# Patient Record
Sex: FEMALE | Race: WHITE | Employment: FULL TIME | URBAN - METROPOLITAN AREA
[De-identification: names, ages, dates, MRNs, and addresses within clinical notes are randomized per-mention and may not be internally consistent; named-entity substitution may affect disease eponyms.]

---

## 2017-05-03 ENCOUNTER — HOSPITAL ENCOUNTER (OUTPATIENT)
Dept: PHYSICAL THERAPY | Age: 32
Discharge: HOME | End: 2017-05-03
Payer: COMMERCIAL

## 2017-05-03 PROCEDURE — 97110 THERAPEUTIC EXERCISES: CPT

## 2017-05-03 PROCEDURE — 97161 PT EVAL LOW COMPLEX 20 MIN: CPT

## 2017-05-03 NOTE — PROGRESS NOTES
Garett Adeline  : 1985 [facility] [address]  [phone]   [phone]          OUTPATIENT PHYSICAL THERAPY:Initial Assessment and Daily Note 5/3/2017    ICD-10: Treatment Diagnosis: Plantar fascial fibromatosis (M72.2), Tarsal tunnel syndrome, left and right (G57.51, G57.52), Pain in right ankle/joints of foot (M25.571), Pain in left ankle/joints of foot (M25.572)  Precautions/Allergies:   No known allergies  Fall Risk Score: 0 (? 5 = High Risk)  MD Orders: Evaluate and Treat MEDICAL/REFERRING DIAGNOSIS:  Plantar fascial fibromatosis [M72.2]  Tarsal tunnel syndrome, left lower limb [G57.52]  Tarsal tunnel syndrome, right lower limb [G57.51]   DATE OF ONSET: 3 years ago  REFERRING PHYSICIAN: Ministerio Gordon MD  RETURN PHYSICIAN APPOINTMENT: TBD     INITIAL ASSESSMENT:  Ms. Neil May presents with bilateral plantar fasciitis which has been present for the past 3 years. Her chief complaint is pain but also has decreased flexibility, strength and functional activity tolerance which limits her ability to perform work and ADL tasks without complaints of severe foot pain. She will benefit from skilled therapy in order to improve her strength, flexibility and pain to return to highest level of function possible     PROBLEM LIST (Impacting functional limitations):  1. Decreased Strength  2. Decreased ADL/Functional Activities  3. Decreased Balance  4. Increased Pain  5. Decreased Activity Tolerance  6. Decreased Flexibility/Joint Mobility  7. Decreased [place] with Home Exercise Program INTERVENTIONS PLANNED:  1. Balance Exercise  2. Home Exercise Program (HEP)  3. Manual Therapy  4. Range of Motion (ROM)  5. Therapeutic Exercise/Strengthening  6. Ultrasound (US)   TREATMENT PLAN:  Effective Dates: 17 TO 17.   Frequency/Duration: 2 times a week for 12 weeks  GOALS: (Goals have been discussed and agreed upon with patient.)  Short-Term Functional Goals: Time Frame: 4 weeks  1. Patient will be independent with home exercise program without assistance from therapist.   2. Patient will report pain rated at less than 6/10 at end of work day to demonstrate improved activity tolerance and decreased pain with prolonged standing. 3. Patient will report ability to go up/down stairs with less pain to maximize ADL function. Discharge Goals: Time Frame: 12  1. Patient will report FAAM score of 65/84 or greater to demonstrate improved functional capacity. 2. Patient will demonstrate ability to perform 25 or more SL heel raises bilaterally to demonstrate improved LE function and strength. 3. Patient will report pain rated at less than 3/10 with prolonged walking or standing to maximize work performance. Rehabilitation Potential For Stated Goals: Good  Regarding Jessica Champagne's therapy, I certify that the treatment plan above will be carried out by a therapist or under their direction. Thank you for this referral,  Jenaro Whitlock. [place]     Referring Physician Signature: Shanna Escalona MD              Date                    The information in this section was collected on 5/3/17 (except where otherwise noted). HISTORY:   History of Present Injury/Illness (Reason for Referral):  Sarah Cespedes presents with chronic history of bilateral foot pain. She originally began having pain 3 years ago and was seen by podiatrist who provided injections and inserts, neither of which provided her any relief. She then saw Dr. Mike Beard who prescribed does of Prednisone, aircasts, night splints and braces which have helped some the past few weeks. Her goals are to decrease her pain, avoid surgery and improve enough to go on trip to [facility] in September without difficulty. Past Medical History/Comorbidities:   Ms. Pedro Mcbride has past medical history significant for anxiety.    Social History/Living Environment:     Lives in private setting home, no barriers to progress. Prior Level of Function/Work/Activity:  Works a  at [employer], which requires her to be on her feet all day. Current Medications:     Tri-previfem, daily   Date Last Reviewed:  5/4/2017     Number of Personal Factors/Comorbidities that affect the Plan of Care: 1-2: MODERATE COMPLEXITY   EXAMINATION:   Observation/Gait Assessment:  Patient ambulates with mild antalgic pattern (barefoot) with increased left hip IR and circumduction with swing. She also tends to avoid heel strike and bearing weight along medial border of her feet. Palpation: Patient tender to palpation at medial tubercle of calcaneus and into arch of bilateral feet. She also notes some intermittent numbness in her 2nd and 3rd toes both feet. Strength:       RIGHT LEFT    Ankle plantarflexion 15 SL heel raises, painful 15 SL heel raises, painful     Ankle dorsiflexion 5/5 5/5    Ankle eversion 5/5 5/5    Ankle inversion 4/5 4/5          ROM:       RIGHT LEFT    Ankle plantarflexion WNL WNL    Ankle dorsiflexion Slight restriction Slight restriction    Great toe extension <45 degrees PROM <45 degrees PROM    Ankle eversion WNL WNL    Ankle inversion WNL WNL                Flexibility:       RIGHT LEFT    Gastrocnemius  Moderate restriction Moderate restriction    Soleus Moderate restriction Moderate restriction    Plantar fascia Moderate restriction Moderate restriction          Functional Mobility:            Overhead Deep Squat To 90, painful To 90, painful    Toe touch Painful Painful    Stepup Painful Painful    Step down Painful  Painful                            Body Structures Involved:  1. Bones  2. Joints  3. Muscles Body Functions Affected:  1. Sensory/Pain  2. Neuromusculoskeletal  3. Movement Related Activities and Participation Affected:  1. General Tasks and Demands  2. Mobility  3. Self Care  4.  Community, Social and [place]   Number of elements (examined above) that affect the Plan of Care: 4+: HIGH COMPLEXITY   CLINICAL PRESENTATION:   Presentation: Stable and uncomplicated: LOW COMPLEXITY   CLINICAL DECISION MAKING:   Outcome Measure: Tool Used: PT/OT FOOT AND ANKLE ABILITY MEASURE  Score:  Initial: 47/84 (5/4/17) Most Recent: X (Date: -- )   Interpretation of Score: For the \"Activities of Daily Living\", there are 21 questions each scored on a 5 point scale with 0 representing \"Unable to do\" and 4 representing \"No difficulty\". The lower the score, the greater the functional disability. 84/84 represents no disability. Minimal detectable change is 5.7 points. With the addition of the 8 questions in the \"Sports Subscale,\" there are 29 questions, each scored on a 5 point scale with 0 representing \"Unable to do\" and 4 representing \"No difficulty\". The lower the score, the greater the functional disability. 116/116 represents no disability. Minimal detectable change is 12.3 points. Medical Necessity:   · Patient is expected to demonstrate progress in strength and range of motion to increase independence with ADL and work related tasks. Reason for Services/Other Comments:  · Patient continues to require present interventions due to patient's inability to walk or stand for prolonged periods of time. Use of outcome tool(s) and clinical judgement create a POC that gives a: Clear prediction of patient's progress: LOW COMPLEXITY            TREATMENT:   (In addition to Assessment/Re-Assessment sessions the following treatments were rendered)  Pre-treatment Symptoms/Complaints:  Patient says both feet are hurting a lot today after working all night last night. Pain: Initial:   Pain Intensity 1: 6/10 Post Session:  5/10     Therapeutic Exercise: (15 Minutes):  Exercises per grid below to improve mobility and strength. Required moderate verbal, manual and tactile cues to promote proper body mechanics. Progressed resistance, range, repetitions and complexity of movement as indicated. Date:  5/3/17 Date:   Date:     Activity/Exercise Parameters Parameters Parameters   Patient Education Plan of care, HEP     Plantar fascia stretch Towel x 30s     Towel crunches 2 min     Big toe mobilizations 5 min                             Treatment/Session Assessment:    · Response to Treatment:  Patient responds well to initial HEP tasks without complaints of increased irritation to bilateral feet. · Compliance with Program/Exercises: compliant most of the time. · Recommendations/Intent for next treatment session: \"Next visit will focus on advancements to more challenging activities\". Total Treatment Duration: 45 minutes evaluation, 15 minutes treatment   PT Patient Time In/Time Out  Time In: 7480  Time Out: [address] [facility]

## 2017-05-03 NOTE — PROGRESS NOTES
Ambulatory/Rehab Services H2 Model Falls Risk Assessment    Risk Factor Pts. ·   Confusion/Disorientation/Impulsivity  []    4 ·   Symptomatic Depression  []   2 ·   Altered Elimination  []   1 ·   Dizziness/Vertigo  []   1 ·   Gender (Male)  []   1 ·   Any administered antiepileptics (anticonvulsants):  []   2 ·   Any administered benzodiazepines:  []   1 ·   Visual Impairment (specify):  []   1 ·   Portable Oxygen Use  []   1 ·   Orthostatic ? BP  []   1 ·   History of Recent Falls (within 3 mos.)  []   5     Ability to Rise from Chair (choose one) Pts. ·   Ability to rise in a single movement  [x]   0 ·   Pushes up, successful in one attempt  []   1 ·   Multiple attempts, but successful  []   3 ·   Unable to rise without assistance  []   4   Total: (5 or greater = High Risk) 0     Falls Prevention Plan:   []                Physical Limitations to Exercise (specify):   []                Mobility Assistance Device (type):   []                Exercise/Equipment Adaptation (specify):    ©2010 [facility] [address] Patent #[id number].  Federal Law prohibits the replication, distribution or use without written permission from [facility] [employer]

## 2017-05-05 ENCOUNTER — HOSPITAL ENCOUNTER (OUTPATIENT)
Dept: PHYSICAL THERAPY | Age: 32
Discharge: HOME | End: 2017-05-05
Payer: COMMERCIAL

## 2017-05-05 PROCEDURE — 97140 MANUAL THERAPY 1/> REGIONS: CPT

## 2017-05-05 PROCEDURE — 97110 THERAPEUTIC EXERCISES: CPT

## 2017-05-05 PROCEDURE — 97035 APP MDLTY 1+ULTRASOUND EA 15: CPT

## 2017-05-05 NOTE — PROGRESS NOTES
Hany Cabrales  : 1985 [facility] [address]  [phone]   [phone]          OUTPATIENT PHYSICAL THERAPY:Daily Note 2017    ICD-10: Treatment Diagnosis: Plantar fascial fibromatosis (M72.2), Tarsal tunnel syndrome, left and right (G57.51, G57.52), Pain in right ankle/joints of foot (M25.571), Pain in left ankle/joints of foot (M25.572)  Precautions/Allergies:   No known allergies  Fall Risk Score: 0 (? 5 = High Risk)  MD Orders: Evaluate and Treat MEDICAL/REFERRING DIAGNOSIS:  Plantar fascial fibromatosis [M72.2]  Tarsal tunnel syndrome, left lower limb [G57.52]  Tarsal tunnel syndrome, right lower limb [G57.51]   DATE OF ONSET: 3 years ago  REFERRING PHYSICIAN: Shayy Fernandez MD  RETURN PHYSICIAN APPOINTMENT: TBD     INITIAL ASSESSMENT:  Ms. Carlos Alberto Modi presents with bilateral plantar fasciitis which has been present for the past 3 years. Her chief complaint is pain but also has decreased flexibility, strength and functional activity tolerance which limits her ability to perform work and ADL tasks without complaints of severe foot pain. She will benefit from skilled therapy in order to improve her strength, flexibility and pain to return to highest level of function possible     PROBLEM LIST (Impacting functional limitations):  1. Decreased Strength  2. Decreased ADL/Functional Activities  3. Decreased Balance  4. Increased Pain  5. Decreased Activity Tolerance  6. Decreased Flexibility/Joint Mobility  7. Decreased [place] with Home Exercise Program INTERVENTIONS PLANNED:  1. Balance Exercise  2. Home Exercise Program (HEP)  3. Manual Therapy  4. Range of Motion (ROM)  5. Therapeutic Exercise/Strengthening  6. Ultrasound (US)   TREATMENT PLAN:  Effective Dates: 17 TO 17.   Frequency/Duration: 2 times a week for 12 weeks  GOALS: (Goals have been discussed and agreed upon with patient.)  Short-Term Functional Goals: Time Frame: 4 weeks  1. Patient will be independent with home exercise program without assistance from therapist.   2. Patient will report pain rated at less than 6/10 at end of work day to demonstrate improved activity tolerance and decreased pain with prolonged standing. 3. Patient will report ability to go up/down stairs with less pain to maximize ADL function. Discharge Goals: Time Frame: 12  1. Patient will report FAAM score of 65/84 or greater to demonstrate improved functional capacity. 2. Patient will demonstrate ability to perform 25 or more SL heel raises bilaterally to demonstrate improved LE function and strength. 3. Patient will report pain rated at less than 3/10 with prolonged walking or standing to maximize work performance. Rehabilitation Potential For Stated Goals: Good              The information in this section was collected on 5/3/17 (except where otherwise noted). HISTORY:   History of Present Injury/Illness (Reason for Referral):  María Yates presents with chronic history of bilateral foot pain. She originally began having pain 3 years ago and was seen by podiatrist who provided injections and inserts, neither of which provided her any relief. She then saw Dr. Esperanza Munoz who prescribed does of Prednisone, aircasts, night splints and braces which have helped some the past few weeks. Her goals are to decrease her pain, avoid surgery and improve enough to go on trip to [facility] in September without difficulty. Past Medical History/Comorbidities:   Ms. Katherin Coker has past medical history significant for anxiety. Social History/Living Environment:     Lives in private setting home, no barriers to progress. Prior Level of Function/Work/Activity:  Works a  at [employer], which requires her to be on her feet all day.      Current Medications:     Tri-previfem, daily   Date Last Reviewed:  5/5/2017     Number of Personal Factors/Comorbidities that affect the Plan of Care: 1-2: MODERATE COMPLEXITY   EXAMINATION:   Observation/Gait Assessment:  Patient ambulates with mild antalgic pattern (barefoot) with increased left hip IR and circumduction with swing. She also tends to avoid heel strike and bearing weight along medial border of her feet. Palpation: Patient tender to palpation at medial tubercle of calcaneus and into arch of bilateral feet. She also notes some intermittent numbness in her 2nd and 3rd toes both feet. Strength:       RIGHT LEFT    Ankle plantarflexion 15 SL heel raises, painful 15 SL heel raises, painful     Ankle dorsiflexion 5/5 5/5    Ankle eversion 5/5 5/5    Ankle inversion 4/5 4/5          ROM:       RIGHT LEFT    Ankle plantarflexion WNL WNL    Ankle dorsiflexion Slight restriction Slight restriction    Great toe extension <45 degrees PROM <45 degrees PROM    Ankle eversion WNL WNL    Ankle inversion WNL WNL                Flexibility:       RIGHT LEFT    Gastrocnemius  Moderate restriction Moderate restriction    Soleus Moderate restriction Moderate restriction    Plantar fascia Moderate restriction Moderate restriction          Functional Mobility:            Overhead Deep Squat To 90, painful To 90, painful    Toe touch Painful Painful    Stepup Painful Painful    Step down Painful  Painful                            Body Structures Involved:  1. Bones  2. Joints  3. Muscles Body Functions Affected:  1. Sensory/Pain  2. Neuromusculoskeletal  3. Movement Related Activities and Participation Affected:  1. General Tasks and Demands  2. Mobility  3. Self Care  4. Community, Social and [place]   Number of elements (examined above) that affect the Plan of Care: 4+: HIGH COMPLEXITY   CLINICAL PRESENTATION:   Presentation: Stable and uncomplicated: LOW COMPLEXITY   CLINICAL DECISION MAKING:   Outcome Measure: Tool Used: PT/OT FOOT AND ANKLE ABILITY MEASURE  Score:  Initial: 47/84 (5/4/17) Most Recent: X (Date: -- )   Interpretation of Score:  For the \"Activities of Daily Living\", there are 21 questions each scored on a 5 point scale with 0 representing \"Unable to do\" and 4 representing \"No difficulty\". The lower the score, the greater the functional disability. 84/84 represents no disability. Minimal detectable change is 5.7 points. With the addition of the 8 questions in the \"Sports Subscale,\" there are 29 questions, each scored on a 5 point scale with 0 representing \"Unable to do\" and 4 representing \"No difficulty\". The lower the score, the greater the functional disability. 116/116 represents no disability. Minimal detectable change is 12.3 points. Medical Necessity:   · Patient is expected to demonstrate progress in strength and range of motion to increase independence with ADL and work related tasks. Reason for Services/Other Comments:  · Patient continues to require present interventions due to patient's inability to walk or stand for prolonged periods of time. Use of outcome tool(s) and clinical judgement create a POC that gives a: Clear prediction of patient's progress: LOW COMPLEXITY            TREATMENT:   (In addition to Assessment/Re-Assessment sessions the following treatments were rendered)  Pre-treatment Symptoms/Complaints:  Patient says both feet are hurting a lot today after working all night last night. Pain: Initial:   Pain Intensity 1: 6/10 Post Session: 4/10     Therapeutic Exercise: (15 Minutes):  Exercises per grid below to improve mobility and strength. Required moderate verbal, manual and tactile cues to promote proper body mechanics. Progressed resistance, range, repetitions and complexity of movement as indicated.      Date:  5/3/17 Date:  5/5/17 Date:     Activity/Exercise Parameters Parameters Parameters   Patient Education Plan of care, HEP Review HEP    Plantar fascia stretch Towel x 30s Towel, 1 min x 2 each foot    Towel crunches 2 min --    Big toe mobilizations 5 min --    Ankle cirlces, ABCs -- 2 min each                  Manual Therapy (    Soft Tissue Mobilization Duration  Duration: 30 Minutes): Manual techniques to facilitate improved motion and decreased pain. · Big toe mobilizations, distraction, bilateral  · Metatarsal glides, all directions, bilateral   · Soft tissue mobilization, plantar fascia, achilles tendon, bilaterally     Modalities (15 minutes)  - Ultrasound, 3 mhz, 100%, 0.5 W/Cm2, 7:30 each foot    Treatment/Session Assessment:    · Response to Treatment:  Patient with decreased discomfort when walking post manual and modality treatment today. · Compliance with Program/Exercises: compliant most of the time. · Recommendations/Intent for next treatment session: \"Next visit will focus on advancements to more challenging activities\". Total Treatment Duration: 60 minutes   PT Patient Time In/Time Out  Time In: 1130  Time Out: [address].  Karen

## 2017-05-10 ENCOUNTER — HOSPITAL ENCOUNTER (OUTPATIENT)
Dept: PHYSICAL THERAPY | Age: 32
Discharge: HOME | End: 2017-05-10
Payer: COMMERCIAL

## 2017-05-10 PROCEDURE — 97035 APP MDLTY 1+ULTRASOUND EA 15: CPT

## 2017-05-10 PROCEDURE — 97110 THERAPEUTIC EXERCISES: CPT

## 2017-05-10 PROCEDURE — 97140 MANUAL THERAPY 1/> REGIONS: CPT

## 2017-05-10 NOTE — PROGRESS NOTES
Melodie Garcia  : 1985 [facility] [address]  [phone]   [phone]          OUTPATIENT PHYSICAL THERAPY:Daily Note 5/10/2017    ICD-10: Treatment Diagnosis: Plantar fascial fibromatosis (M72.2), Tarsal tunnel syndrome, left and right (G57.51, G57.52), Pain in right ankle/joints of foot (M25.571), Pain in left ankle/joints of foot (M25.572)  Precautions/Allergies:   No known allergies  Fall Risk Score: 0 (? 5 = High Risk)  MD Orders: Evaluate and Treat MEDICAL/REFERRING DIAGNOSIS:  Plantar fascial fibromatosis [M72.2]  Tarsal tunnel syndrome, left lower limb [G57.52]  Tarsal tunnel syndrome, right lower limb [G57.51]   DATE OF ONSET: 3 years ago  REFERRING PHYSICIAN: Anna Jarquin MD  RETURN PHYSICIAN APPOINTMENT: TBD     INITIAL ASSESSMENT:  Ms. Cesar Mansfield presents with bilateral plantar fasciitis which has been present for the past 3 years. Her chief complaint is pain but also has decreased flexibility, strength and functional activity tolerance which limits her ability to perform work and ADL tasks without complaints of severe foot pain. She will benefit from skilled therapy in order to improve her strength, flexibility and pain to return to highest level of function possible     PROBLEM LIST (Impacting functional limitations):  1. Decreased Strength  2. Decreased ADL/Functional Activities  3. Decreased Balance  4. Increased Pain  5. Decreased Activity Tolerance  6. Decreased Flexibility/Joint Mobility  7. Decreased [place] with Home Exercise Program INTERVENTIONS PLANNED:  1. Balance Exercise  2. Home Exercise Program (HEP)  3. Manual Therapy  4. Range of Motion (ROM)  5. Therapeutic Exercise/Strengthening  6. Ultrasound (US)   TREATMENT PLAN:  Effective Dates: 17 TO 17.   Frequency/Duration: 2 times a week for 12 weeks  GOALS: (Goals have been discussed and agreed upon with patient.)  Short-Term Functional Goals: Time Frame: 4 weeks  1. Patient will be independent with home exercise program without assistance from therapist.   2. Patient will report pain rated at less than 6/10 at end of work day to demonstrate improved activity tolerance and decreased pain with prolonged standing. 3. Patient will report ability to go up/down stairs with less pain to maximize ADL function. Discharge Goals: Time Frame: 12  1. Patient will report FAAM score of 65/84 or greater to demonstrate improved functional capacity. 2. Patient will demonstrate ability to perform 25 or more SL heel raises bilaterally to demonstrate improved LE function and strength. 3. Patient will report pain rated at less than 3/10 with prolonged walking or standing to maximize work performance. Rehabilitation Potential For Stated Goals: Good              The information in this section was collected on 5/3/17 (except where otherwise noted). HISTORY:   History of Present Injury/Illness (Reason for Referral):  Anay Hayes presents with chronic history of bilateral foot pain. She originally began having pain 3 years ago and was seen by podiatrist who provided injections and inserts, neither of which provided her any relief. She then saw Dr. Luly Small who prescribed does of Prednisone, aircasts, night splints and braces which have helped some the past few weeks. Her goals are to decrease her pain, avoid surgery and improve enough to go on trip to [facility] in September without difficulty. Past Medical History/Comorbidities:   Ms. Eddy Sosa has past medical history significant for anxiety. Social History/Living Environment:     Lives in private setting home, no barriers to progress. Prior Level of Function/Work/Activity:  Works a  at [employer], which requires her to be on her feet all day.      Current Medications:     Tri-previfem, daily   Date Last Reviewed:  5/10/2017     Number of Personal Factors/Comorbidities that affect the Plan of Care: 1-2: MODERATE COMPLEXITY   EXAMINATION:   Observation/Gait Assessment:  Patient ambulates with mild antalgic pattern (barefoot) with increased left hip IR and circumduction with swing. She also tends to avoid heel strike and bearing weight along medial border of her feet. Palpation: Patient tender to palpation at medial tubercle of calcaneus and into arch of bilateral feet. She also notes some intermittent numbness in her 2nd and 3rd toes both feet. Strength:       RIGHT LEFT    Ankle plantarflexion 15 SL heel raises, painful 15 SL heel raises, painful     Ankle dorsiflexion 5/5 5/5    Ankle eversion 5/5 5/5    Ankle inversion 4/5 4/5          ROM:       RIGHT LEFT    Ankle plantarflexion WNL WNL    Ankle dorsiflexion Slight restriction Slight restriction    Great toe extension <45 degrees PROM <45 degrees PROM    Ankle eversion WNL WNL    Ankle inversion WNL WNL                Flexibility:       RIGHT LEFT    Gastrocnemius  Moderate restriction Moderate restriction    Soleus Moderate restriction Moderate restriction    Plantar fascia Moderate restriction Moderate restriction          Functional Mobility:            Overhead Deep Squat To 90, painful To 90, painful    Toe touch Painful Painful    Stepup Painful Painful    Step down Painful  Painful                            Body Structures Involved:  1. Bones  2. Joints  3. Muscles Body Functions Affected:  1. Sensory/Pain  2. Neuromusculoskeletal  3. Movement Related Activities and Participation Affected:  1. General Tasks and Demands  2. Mobility  3. Self Care  4. Community, Social and [place]   Number of elements (examined above) that affect the Plan of Care: 4+: HIGH COMPLEXITY   CLINICAL PRESENTATION:   Presentation: Stable and uncomplicated: LOW COMPLEXITY   CLINICAL DECISION MAKING:   Outcome Measure: Tool Used: PT/OT FOOT AND ANKLE ABILITY MEASURE  Score:  Initial: 47/84 (5/4/17) Most Recent: X (Date: -- )   Interpretation of Score:  For the \"Activities of Daily Living\", there are 21 questions each scored on a 5 point scale with 0 representing \"Unable to do\" and 4 representing \"No difficulty\". The lower the score, the greater the functional disability. 84/84 represents no disability. Minimal detectable change is 5.7 points. With the addition of the 8 questions in the \"Sports Subscale,\" there are 29 questions, each scored on a 5 point scale with 0 representing \"Unable to do\" and 4 representing \"No difficulty\". The lower the score, the greater the functional disability. 116/116 represents no disability. Minimal detectable change is 12.3 points. Medical Necessity:   · Patient is expected to demonstrate progress in strength and range of motion to increase independence with ADL and work related tasks. Reason for Services/Other Comments:  · Patient continues to require present interventions due to patient's inability to walk or stand for prolonged periods of time. Use of outcome tool(s) and clinical judgement create a POC that gives a: Clear prediction of patient's progress: LOW COMPLEXITY            TREATMENT:   (In addition to Assessment/Re-Assessment sessions the following treatments were rendered)  Pre-treatment Symptoms/Complaints:  Patient says that although her feet felt better the day after last session, they have been very painful the past 2-3 days. Pain: Initial:   Pain Intensity 1: 6/10 Post Session: 4/10     Therapeutic Exercise: (15 Minutes):  Exercises per grid below to improve mobility and strength. Required moderate verbal, manual and tactile cues to promote proper body mechanics. Progressed resistance, range, repetitions and complexity of movement as indicated.      Date:  5/3/17 Date:  5/5/17 Date:  5/10/17   Activity/Exercise Parameters Parameters Parameters   Patient Education Plan of care, HEP Review HEP Update HEP   Plantar fascia stretch Towel x 30s Towel, 1 min x 2 each foot 1 min x 2   Towel crunches 2 min -- -- Big toe mobilizations 5 min -- --   Ankle cirlces, ABCs -- 2 min each 2 min   Loading progression -- -- SL heel raise, toes elevated (x5 on L, x8 on R)           Manual Therapy (    Soft Tissue Mobilization Duration  Duration: 30 Minutes): Manual techniques to facilitate improved motion and decreased pain. · Big toe mobilizations, distraction, bilateral  · Metatarsal glides, all directions, bilateral   · Soft tissue mobilization, plantar fascia, achilles tendon, bilaterally     Modalities (15 minutes)  - Ultrasound, 3 mhz, 100%, 0.5 W/Cm2, 7:30 each foot    Treatment/Session Assessment:    · Response to Treatment:  Patient symptoms more irritable today but states slight decrease in discomfort at end of session. She demonstrate better tolerance to initial loading protocol on right foot versus left today but able to complete without increasing her pain. · Compliance with Program/Exercises: compliant most of the time. · Recommendations/Intent for next treatment session: \"Next visit will focus on advancements to more challenging activities\". Total Treatment Duration: 60 minutes   PT Patient Time In/Time Out  Time In: 1330  Time Out: [address] [facility] [address] [place]

## 2017-05-12 ENCOUNTER — HOSPITAL ENCOUNTER (OUTPATIENT)
Dept: PHYSICAL THERAPY | Age: 32
Discharge: HOME | End: 2017-05-12
Payer: COMMERCIAL

## 2017-05-12 PROCEDURE — 97140 MANUAL THERAPY 1/> REGIONS: CPT

## 2017-05-12 PROCEDURE — 97110 THERAPEUTIC EXERCISES: CPT

## 2017-05-12 NOTE — PROGRESS NOTES
Farideh Record  : 1985 [facility] [address]  [phone]   [phone]          OUTPATIENT PHYSICAL THERAPY:Daily Note 2017    ICD-10: Treatment Diagnosis: Plantar fascial fibromatosis (M72.2), Tarsal tunnel syndrome, left and right (G57.51, G57.52), Pain in right ankle/joints of foot (M25.571), Pain in left ankle/joints of foot (M25.572)  Precautions/Allergies:   No known allergies  Fall Risk Score: 0 (? 5 = High Risk)  MD Orders: Evaluate and Treat MEDICAL/REFERRING DIAGNOSIS:  Plantar fascial fibromatosis [M72.2]  Tarsal tunnel syndrome, left lower limb [G57.52]  Tarsal tunnel syndrome, right lower limb [G57.51]   DATE OF ONSET: 3 years ago  REFERRING PHYSICIAN: Olayinka Toro MD  RETURN PHYSICIAN APPOINTMENT: TBD     INITIAL ASSESSMENT:  Ms. Kellie Boo presents with bilateral plantar fasciitis which has been present for the past 3 years. Her chief complaint is pain but also has decreased flexibility, strength and functional activity tolerance which limits her ability to perform work and ADL tasks without complaints of severe foot pain. She will benefit from skilled therapy in order to improve her strength, flexibility and pain to return to highest level of function possible     PROBLEM LIST (Impacting functional limitations):  1. Decreased Strength  2. Decreased ADL/Functional Activities  3. Decreased Balance  4. Increased Pain  5. Decreased Activity Tolerance  6. Decreased Flexibility/Joint Mobility  7. Decreased [place] with Home Exercise Program INTERVENTIONS PLANNED:  1. Balance Exercise  2. Home Exercise Program (HEP)  3. Manual Therapy  4. Range of Motion (ROM)  5. Therapeutic Exercise/Strengthening  6. Ultrasound (US)   TREATMENT PLAN:  Effective Dates: 17 TO 17.   Frequency/Duration: 2 times a week for 12 weeks  GOALS: (Goals have been discussed and agreed upon with patient.)  Short-Term Functional Goals: Time Frame: 4 weeks  1. Patient will be independent with home exercise program without assistance from therapist.   2. Patient will report pain rated at less than 6/10 at end of work day to demonstrate improved activity tolerance and decreased pain with prolonged standing. 3. Patient will report ability to go up/down stairs with less pain to maximize ADL function. Discharge Goals: Time Frame: 12  1. Patient will report FAAM score of 65/84 or greater to demonstrate improved functional capacity. 2. Patient will demonstrate ability to perform 25 or more SL heel raises bilaterally to demonstrate improved LE function and strength. 3. Patient will report pain rated at less than 3/10 with prolonged walking or standing to maximize work performance. Rehabilitation Potential For Stated Goals: Good              The information in this section was collected on 5/3/17 (except where otherwise noted). HISTORY:   History of Present Injury/Illness (Reason for Referral):  Chery Wallace presents with chronic history of bilateral foot pain. She originally began having pain 3 years ago and was seen by podiatrist who provided injections and inserts, neither of which provided her any relief. She then saw Dr. Felipe Sr who prescribed does of Prednisone, aircasts, night splints and braces which have helped some the past few weeks. Her goals are to decrease her pain, avoid surgery and improve enough to go on trip to [facility] in September without difficulty. Past Medical History/Comorbidities:   Ms. Brennan Leung has past medical history significant for anxiety. Social History/Living Environment:     Lives in private setting home, no barriers to progress. Prior Level of Function/Work/Activity:  Works a  at [employer], which requires her to be on her feet all day.      Current Medications:     Tri-previfem, daily   Date Last Reviewed:  5/12/2017     Number of Personal Factors/Comorbidities that affect the Plan of Care: 1-2: MODERATE COMPLEXITY   EXAMINATION:   Observation/Gait Assessment:  Patient ambulates with mild antalgic pattern (barefoot) with increased left hip IR and circumduction with swing. She also tends to avoid heel strike and bearing weight along medial border of her feet. Palpation: Patient tender to palpation at medial tubercle of calcaneus and into arch of bilateral feet. She also notes some intermittent numbness in her 2nd and 3rd toes both feet. Strength:       RIGHT LEFT    Ankle plantarflexion 15 SL heel raises, painful 15 SL heel raises, painful     Ankle dorsiflexion 5/5 5/5    Ankle eversion 5/5 5/5    Ankle inversion 4/5 4/5          ROM:       RIGHT LEFT    Ankle plantarflexion WNL WNL    Ankle dorsiflexion Slight restriction Slight restriction    Great toe extension <45 degrees PROM <45 degrees PROM    Ankle eversion WNL WNL    Ankle inversion WNL WNL                Flexibility:       RIGHT LEFT    Gastrocnemius  Moderate restriction Moderate restriction    Soleus Moderate restriction Moderate restriction    Plantar fascia Moderate restriction Moderate restriction          Functional Mobility:            Overhead Deep Squat To 90, painful To 90, painful    Toe touch Painful Painful    Stepup Painful Painful    Step down Painful  Painful                            Body Structures Involved:  1. Bones  2. Joints  3. Muscles Body Functions Affected:  1. Sensory/Pain  2. Neuromusculoskeletal  3. Movement Related Activities and Participation Affected:  1. General Tasks and Demands  2. Mobility  3. Self Care  4. Community, Social and [place]   Number of elements (examined above) that affect the Plan of Care: 4+: HIGH COMPLEXITY   CLINICAL PRESENTATION:   Presentation: Stable and uncomplicated: LOW COMPLEXITY   CLINICAL DECISION MAKING:   Outcome Measure: Tool Used: PT/OT FOOT AND ANKLE ABILITY MEASURE  Score:  Initial: 47/84 (5/4/17) Most Recent: X (Date: -- )   Interpretation of Score:  For the \"Activities of Daily Living\", there are 21 questions each scored on a 5 point scale with 0 representing \"Unable to do\" and 4 representing \"No difficulty\". The lower the score, the greater the functional disability. 84/84 represents no disability. Minimal detectable change is 5.7 points. With the addition of the 8 questions in the \"Sports Subscale,\" there are 29 questions, each scored on a 5 point scale with 0 representing \"Unable to do\" and 4 representing \"No difficulty\". The lower the score, the greater the functional disability. 116/116 represents no disability. Minimal detectable change is 12.3 points. Medical Necessity:   · Patient is expected to demonstrate progress in strength and range of motion to increase independence with ADL and work related tasks. Reason for Services/Other Comments:  · Patient continues to require present interventions due to patient's inability to walk or stand for prolonged periods of time. Use of outcome tool(s) and clinical judgement create a POC that gives a: Clear prediction of patient's progress: LOW COMPLEXITY            TREATMENT:   (In addition to Assessment/Re-Assessment sessions the following treatments were rendered)  Pre-treatment Symptoms/Complaints:  Patient says her left foot has continued to be very sore the past 2 days. Pain: Initial:   Pain Intensity 1: 5/10 Post Session: 4/10     Therapeutic Exercise: (30 Minutes):  Exercises per grid below to improve mobility and strength. Required moderate verbal, manual and tactile cues to promote proper body mechanics. Progressed resistance, range, repetitions and complexity of movement as indicated.      Date:  5/5/17 Date:  5/10/17 Date:  5/12/17   Activity/Exercise Parameters Parameters Parameters   Patient Education Review HEP Update HEP Review HEP   Plantar fascia stretch Towel, 1 min x 2 each foot 1 min x 2 1 min x 4   Towel crunches -- -- --   Big toe mobilizations -- -- --   Ankle cirlces, ABCs 2 min each 2 min 2 min   Loading progression -- SL heel raise, toes elevated (x5 on L, x8 on R) Blue band, 2 s concentric, isometric, eccentric   Eccentric posterior tibialis -- -- Red, 2 x 10 each   Ball rolls -- -- 2 min each foot     Manual Therapy (    Soft Tissue Mobilization Duration  Duration: 30 Minutes): Manual techniques to facilitate improved motion and decreased pain. · Big toe mobilizations, distraction, bilateral  · Metatarsal glides, all directions, bilateral   · Soft tissue mobilization, plantar fascia, achilles tendon, bilaterally     Modalities (none performed today)  - Ultrasound, 3 mhz, 100%, 0.5 W/Cm2, 7:30 each foot    Treatment/Session Assessment:    · Response to Treatment:  Patient continues to make very slow progress. She states weight bearing load progression exercises increased her pain so regressed to seated blue theraband to continue concentric/eccentric work but without increases patient symptoms. · Compliance with Program/Exercises: compliant most of the time. · Recommendations/Intent for next treatment session: \"Next visit will focus on advancements to more challenging activities\". Total Treatment Duration: 60 minutes   PT Patient Time In/Time Out  Time In: 1300  Time Out: [address] [facility]

## 2017-05-16 ENCOUNTER — APPOINTMENT (OUTPATIENT)
Dept: PHYSICAL THERAPY | Age: 32
End: 2017-05-16
Payer: COMMERCIAL

## 2017-05-18 ENCOUNTER — HOSPITAL ENCOUNTER (OUTPATIENT)
Dept: PHYSICAL THERAPY | Age: 32
Discharge: HOME | End: 2017-05-18
Payer: COMMERCIAL

## 2017-05-18 NOTE — PROGRESS NOTES
[facility] [address] [place] [address] [facility]  [phone]   [phone]    DATE: 5/18/2017    Patient cancelled appointment today due to schedule conflict. Will plan to follow up on next scheduled visit.       Brigid Brandon, PT, DPT

## 2017-05-23 ENCOUNTER — HOSPITAL ENCOUNTER (OUTPATIENT)
Dept: PHYSICAL THERAPY | Age: 32
Discharge: HOME | End: 2017-05-23
Payer: COMMERCIAL

## 2017-05-23 PROCEDURE — 97110 THERAPEUTIC EXERCISES: CPT

## 2017-05-23 PROCEDURE — 97140 MANUAL THERAPY 1/> REGIONS: CPT

## 2017-05-24 NOTE — PROGRESS NOTES
Kayleen Espinal  : 1985 [facility] [address]  [phone]   [phone]          OUTPATIENT PHYSICAL THERAPY:Daily Note 2017    ICD-10: Treatment Diagnosis: Plantar fascial fibromatosis (M72.2), Tarsal tunnel syndrome, left and right (G57.51, G57.52), Pain in right ankle/joints of foot (M25.571), Pain in left ankle/joints of foot (M25.572)  Precautions/Allergies:   No known allergies  Fall Risk Score: 0 (? 5 = High Risk)  MD Orders: Evaluate and Treat MEDICAL/REFERRING DIAGNOSIS:  Plantar fascial fibromatosis [M72.2]  Tarsal tunnel syndrome, left lower limb [G57.52]  Tarsal tunnel syndrome, right lower limb [G57.51]   DATE OF ONSET: 3 years ago  REFERRING PHYSICIAN: Maria Del Carmen Rao MD  RETURN PHYSICIAN APPOINTMENT: TBD     INITIAL ASSESSMENT:  Ms. Giuseppe Wolfe presents with bilateral plantar fasciitis which has been present for the past 3 years. Her chief complaint is pain but also has decreased flexibility, strength and functional activity tolerance which limits her ability to perform work and ADL tasks without complaints of severe foot pain. She will benefit from skilled therapy in order to improve her strength, flexibility and pain to return to highest level of function possible     PROBLEM LIST (Impacting functional limitations):  1. Decreased Strength  2. Decreased ADL/Functional Activities  3. Decreased Balance  4. Increased Pain  5. Decreased Activity Tolerance  6. Decreased Flexibility/Joint Mobility  7. Decreased [place] with Home Exercise Program INTERVENTIONS PLANNED:  1. Balance Exercise  2. Home Exercise Program (HEP)  3. Manual Therapy  4. Range of Motion (ROM)  5. Therapeutic Exercise/Strengthening  6. Ultrasound (US)   TREATMENT PLAN:  Effective Dates: 17 TO 17.   Frequency/Duration: 2 times a week for 12 weeks  GOALS: (Goals have been discussed and agreed upon with patient.)  Short-Term Functional Goals: Time Frame: 4 weeks  1. Patient will be independent with home exercise program without assistance from therapist.   2. Patient will report pain rated at less than 6/10 at end of work day to demonstrate improved activity tolerance and decreased pain with prolonged standing. 3. Patient will report ability to go up/down stairs with less pain to maximize ADL function. Discharge Goals: Time Frame: 12  1. Patient will report FAAM score of 65/84 or greater to demonstrate improved functional capacity. 2. Patient will demonstrate ability to perform 25 or more SL heel raises bilaterally to demonstrate improved LE function and strength. 3. Patient will report pain rated at less than 3/10 with prolonged walking or standing to maximize work performance. Rehabilitation Potential For Stated Goals: Good              The information in this section was collected on 5/3/17 (except where otherwise noted). HISTORY:   History of Present Injury/Illness (Reason for Referral):  Rosamaria Nicholas presents with chronic history of bilateral foot pain. She originally began having pain 3 years ago and was seen by podiatrist who provided injections and inserts, neither of which provided her any relief. She then saw Dr. America Camara who prescribed does of Prednisone, aircasts, night splints and braces which have helped some the past few weeks. Her goals are to decrease her pain, avoid surgery and improve enough to go on trip to [facility] in September without difficulty. Past Medical History/Comorbidities:   Ms. Joseluis Bragg has past medical history significant for anxiety. Social History/Living Environment:     Lives in private setting home, no barriers to progress. Prior Level of Function/Work/Activity:  Works a  at [employer], which requires her to be on her feet all day.      Current Medications:     Tri-previfem, daily   Date Last Reviewed:  5/23/2017     Number of Personal Factors/Comorbidities that affect the Plan of Care: 1-2: MODERATE COMPLEXITY   EXAMINATION:   Observation/Gait Assessment:  Patient ambulates with mild antalgic pattern (barefoot) with increased left hip IR and circumduction with swing. She also tends to avoid heel strike and bearing weight along medial border of her feet. Palpation: Patient tender to palpation at medial tubercle of calcaneus and into arch of bilateral feet. She also notes some intermittent numbness in her 2nd and 3rd toes both feet. Strength:       RIGHT LEFT    Ankle plantarflexion 15 SL heel raises, painful 15 SL heel raises, painful     Ankle dorsiflexion 5/5 5/5    Ankle eversion 5/5 5/5    Ankle inversion 4/5 4/5          ROM:       RIGHT LEFT    Ankle plantarflexion WNL WNL    Ankle dorsiflexion Slight restriction Slight restriction    Great toe extension <45 degrees PROM <45 degrees PROM    Ankle eversion WNL WNL    Ankle inversion WNL WNL                Flexibility:       RIGHT LEFT    Gastrocnemius  Moderate restriction Moderate restriction    Soleus Moderate restriction Moderate restriction    Plantar fascia Moderate restriction Moderate restriction          Functional Mobility:            Overhead Deep Squat To 90, painful To 90, painful    Toe touch Painful Painful    Stepup Painful Painful    Step down Painful  Painful                            Body Structures Involved:  1. Bones  2. Joints  3. Muscles Body Functions Affected:  1. Sensory/Pain  2. Neuromusculoskeletal  3. Movement Related Activities and Participation Affected:  1. General Tasks and Demands  2. Mobility  3. Self Care  4. Community, Social and [place]   Number of elements (examined above) that affect the Plan of Care: 4+: HIGH COMPLEXITY   CLINICAL PRESENTATION:   Presentation: Stable and uncomplicated: LOW COMPLEXITY   CLINICAL DECISION MAKING:   Outcome Measure: Tool Used: PT/OT FOOT AND ANKLE ABILITY MEASURE  Score:  Initial: 47/84 (5/4/17) Most Recent: X (Date: -- )   Interpretation of Score:  For the \"Activities of Daily Living\", there are 21 questions each scored on a 5 point scale with 0 representing \"Unable to do\" and 4 representing \"No difficulty\". The lower the score, the greater the functional disability. 84/84 represents no disability. Minimal detectable change is 5.7 points. With the addition of the 8 questions in the \"Sports Subscale,\" there are 29 questions, each scored on a 5 point scale with 0 representing \"Unable to do\" and 4 representing \"No difficulty\". The lower the score, the greater the functional disability. 116/116 represents no disability. Minimal detectable change is 12.3 points. Medical Necessity:   · Patient is expected to demonstrate progress in strength and range of motion to increase independence with ADL and work related tasks. Reason for Services/Other Comments:  · Patient continues to require present interventions due to patient's inability to walk or stand for prolonged periods of time. Use of outcome tool(s) and clinical judgement create a POC that gives a: Clear prediction of patient's progress: LOW COMPLEXITY            TREATMENT:   (In addition to Assessment/Re-Assessment sessions the following treatments were rendered)  Pre-treatment Symptoms/Complaints:  Patient reports her feet have felt \"a little bit\" better the past week. Pain: Initial:   Pain Intensity 1: 4/10 Post Session: 4/10     Therapeutic Exercise: (40 Minutes):  Exercises per grid below to improve mobility and strength. Required moderate verbal, manual and tactile cues to promote proper body mechanics. Progressed resistance, range, repetitions and complexity of movement as indicated.      Date:  5/10/17 Date:  5/12/17 Date:  5/23/17   Activity/Exercise Parameters Parameters Parameters   Patient Education Update HEP Review HEP Review/Update HEP   Plantar fascia stretch 1 min x 2 1 min x 4 1 min x 2   Towel crunches -- -- --   Big toe mobilizations -- -- --   Ankle cirlces, ABCs 2 min 2 min 2 min   Loading progression SL heel raise, toes elevated (x5 on L, x8 on R) Blue band, 2 s concentric, isometric, eccentric Blue band x 30 each foot   Eccentric posterior tibialis -- Red, 2 x 10 each 2 x 10   Ball rolls -- 2 min each foot Review x 3 min   Ankle PAILS/RAILS -- -- 3 min each foot     Manual Therapy (    Soft Tissue Mobilization Duration  Duration: 15 Minutes): Manual techniques to facilitate improved motion and decreased pain. · Big toe mobilizations, distraction, bilateral (not performed today)  · Metatarsal glides, all directions, bilateral (not performed today)  · Soft tissue mobilization, plantar fascia, achilles tendon, bilaterally, with calcaneal fat pad taping    Modalities (none performed today)  - Ultrasound, 3 mhz, 100%, 0.5 W/Cm2, 7:30 each foot    Treatment/Session Assessment:    · Response to Treatment:  Patient reports increased symptom reduction following taping to reduce pressure on calcaneal fat pad which allows patient to walk with less discomfort today. · Compliance with Program/Exercises: compliant most of the time. · Recommendations/Intent for next treatment session: \"Next visit will focus on advancements to more challenging activities\". Total Treatment Duration: 55 minutes   PT Patient Time In/Time Out  Time In: 1430  Time Out: [place].  Karen

## 2017-05-26 ENCOUNTER — APPOINTMENT (OUTPATIENT)
Dept: PHYSICAL THERAPY | Age: 32
End: 2017-05-26
Payer: COMMERCIAL

## 2017-05-31 ENCOUNTER — HOSPITAL ENCOUNTER (OUTPATIENT)
Dept: PHYSICAL THERAPY | Age: 32
Discharge: HOME | End: 2017-05-31
Payer: COMMERCIAL

## 2017-05-31 PROCEDURE — 97140 MANUAL THERAPY 1/> REGIONS: CPT

## 2017-05-31 PROCEDURE — 97110 THERAPEUTIC EXERCISES: CPT

## 2017-05-31 NOTE — PROGRESS NOTES
Ramón Liang  : 1985 [facility] [address]  [phone]   [phone]          OUTPATIENT PHYSICAL THERAPY:Daily Note 2017    ICD-10: Treatment Diagnosis: Plantar fascial fibromatosis (M72.2), Tarsal tunnel syndrome, left and right (G57.51, G57.52), Pain in right ankle/joints of foot (M25.571), Pain in left ankle/joints of foot (M25.572)  Precautions/Allergies:   No known allergies  Fall Risk Score: 0 (? 5 = High Risk)  MD Orders: Evaluate and Treat MEDICAL/REFERRING DIAGNOSIS:  Plantar fascial fibromatosis [M72.2]  Tarsal tunnel syndrome, left lower limb [G57.52]  Tarsal tunnel syndrome, right lower limb [G57.51]   DATE OF ONSET: 3 years ago  REFERRING PHYSICIAN: Niranjan Reis MD  RETURN PHYSICIAN APPOINTMENT: TBD     INITIAL ASSESSMENT:  Ms. Lady Ryan presents with bilateral plantar fasciitis which has been present for the past 3 years. Her chief complaint is pain but also has decreased flexibility, strength and functional activity tolerance which limits her ability to perform work and ADL tasks without complaints of severe foot pain. She will benefit from skilled therapy in order to improve her strength, flexibility and pain to return to highest level of function possible     PROBLEM LIST (Impacting functional limitations):  1. Decreased Strength  2. Decreased ADL/Functional Activities  3. Decreased Balance  4. Increased Pain  5. Decreased Activity Tolerance  6. Decreased Flexibility/Joint Mobility  7. Decreased [place] with Home Exercise Program INTERVENTIONS PLANNED:  1. Balance Exercise  2. Home Exercise Program (HEP)  3. Manual Therapy  4. Range of Motion (ROM)  5. Therapeutic Exercise/Strengthening  6. Ultrasound (US)   TREATMENT PLAN:  Effective Dates: 17 TO 17.   Frequency/Duration: 2 times a week for 12 weeks  GOALS: (Goals have been discussed and agreed upon with patient.)  Short-Term Functional Goals: Time Frame: 4 weeks  1. Patient will be independent with home exercise program without assistance from therapist.   2. Patient will report pain rated at less than 6/10 at end of work day to demonstrate improved activity tolerance and decreased pain with prolonged standing. 3. Patient will report ability to go up/down stairs with less pain to maximize ADL function. Discharge Goals: Time Frame: 12  1. Patient will report FAAM score of 65/84 or greater to demonstrate improved functional capacity. 2. Patient will demonstrate ability to perform 25 or more SL heel raises bilaterally to demonstrate improved LE function and strength. 3. Patient will report pain rated at less than 3/10 with prolonged walking or standing to maximize work performance. Rehabilitation Potential For Stated Goals: Good              The information in this section was collected on 5/3/17 (except where otherwise noted). HISTORY:   History of Present Injury/Illness (Reason for Referral):  Kim Chun presents with chronic history of bilateral foot pain. She originally began having pain 3 years ago and was seen by podiatrist who provided injections and inserts, neither of which provided her any relief. She then saw Dr. Salina Kim who prescribed does of Prednisone, aircasts, night splints and braces which have helped some the past few weeks. Her goals are to decrease her pain, avoid surgery and improve enough to go on trip to [facility] in September without difficulty. Past Medical History/Comorbidities:   Ms. Letty Corbin has past medical history significant for anxiety. Social History/Living Environment:     Lives in private setting home, no barriers to progress. Prior Level of Function/Work/Activity:  Works a  at [employer], which requires her to be on her feet all day.      Current Medications:     Tri-previfem, daily   Date Last Reviewed:  5/31/2017     Number of Personal Factors/Comorbidities that affect the Plan of Care: 1-2: MODERATE COMPLEXITY   EXAMINATION:   Observation/Gait Assessment:  Patient ambulates with mild antalgic pattern (barefoot) with increased left hip IR and circumduction with swing. She also tends to avoid heel strike and bearing weight along medial border of her feet. Palpation: Patient tender to palpation at medial tubercle of calcaneus and into arch of bilateral feet. She also notes some intermittent numbness in her 2nd and 3rd toes both feet. Strength:       RIGHT LEFT    Ankle plantarflexion 15 SL heel raises, painful 15 SL heel raises, painful     Ankle dorsiflexion 5/5 5/5    Ankle eversion 5/5 5/5    Ankle inversion 4/5 4/5          ROM:       RIGHT LEFT    Ankle plantarflexion WNL WNL    Ankle dorsiflexion Slight restriction Slight restriction    Great toe extension <45 degrees PROM <45 degrees PROM    Ankle eversion WNL WNL    Ankle inversion WNL WNL                Flexibility:       RIGHT LEFT    Gastrocnemius  Moderate restriction Moderate restriction    Soleus Moderate restriction Moderate restriction    Plantar fascia Moderate restriction Moderate restriction          Functional Mobility:            Overhead Deep Squat To 90, painful To 90, painful    Toe touch Painful Painful    Stepup Painful Painful    Step down Painful  Painful                            Body Structures Involved:  1. Bones  2. Joints  3. Muscles Body Functions Affected:  1. Sensory/Pain  2. Neuromusculoskeletal  3. Movement Related Activities and Participation Affected:  1. General Tasks and Demands  2. Mobility  3. Self Care  4. Community, Social and [place]   Number of elements (examined above) that affect the Plan of Care: 4+: HIGH COMPLEXITY   CLINICAL PRESENTATION:   Presentation: Stable and uncomplicated: LOW COMPLEXITY   CLINICAL DECISION MAKING:   Outcome Measure: Tool Used: PT/OT FOOT AND ANKLE ABILITY MEASURE  Score:  Initial: 47/84 (5/4/17) Most Recent: X (Date: -- )   Interpretation of Score:  For the \"Activities of Daily Living\", there are 21 questions each scored on a 5 point scale with 0 representing \"Unable to do\" and 4 representing \"No difficulty\". The lower the score, the greater the functional disability. 84/84 represents no disability. Minimal detectable change is 5.7 points. With the addition of the 8 questions in the \"Sports Subscale,\" there are 29 questions, each scored on a 5 point scale with 0 representing \"Unable to do\" and 4 representing \"No difficulty\". The lower the score, the greater the functional disability. 116/116 represents no disability. Minimal detectable change is 12.3 points. Medical Necessity:   · Patient is expected to demonstrate progress in strength and range of motion to increase independence with ADL and work related tasks. Reason for Services/Other Comments:  · Patient continues to require present interventions due to patient's inability to walk or stand for prolonged periods of time. Use of outcome tool(s) and clinical judgement create a POC that gives a: Clear prediction of patient's progress: LOW COMPLEXITY            TREATMENT:   (In addition to Assessment/Re-Assessment sessions the following treatments were rendered)  Pre-treatment Symptoms/Complaints:  Patient says her feet feel about the same but has stopped wearing her boots at work. Pain: Initial:   Pain Intensity 1: 5/10 Post Session: 4/10     Therapeutic Exercise: (30 Minutes):  Exercises per grid below to improve mobility and strength. Required moderate verbal, manual and tactile cues to promote proper body mechanics. Progressed resistance, range, repetitions and complexity of movement as indicated.      Date:  5/12/17 Date:  5/23/17 Date:  5/31/17   Activity/Exercise Parameters Parameters Parameters   Patient Education Review HEP Review/Update HEP Update HEP   Plantar fascia stretch 1 min x 4 1 min x 2 1 min x 2   Towel crunches -- -- Blue band, x25   Big toe mobilizations -- -- --   Ankle Jessenia blaek 2 min 2 min 2 min   Loading progression Blue band, 2 s concentric, isometric, eccentric Blue band x 30 each foot Blue x 30   Eccentric posterior tibialis Red, 2 x 10 each 2 x 10 --   Ball rolls 2 min each foot Review x 3 min 2 min each foot   Ankle PAILS/RAILS -- 3 min each foot Review x 5 min     Manual Therapy (    Soft Tissue Mobilization Duration  Duration: 30 Minutes): Manual techniques to facilitate improved motion and decreased pain. · Big toe mobilizations, distraction, bilateral (not performed today)  · Metatarsal glides, all directions, bilateral (not performed today)  · Soft tissue mobilization, plantar fascia, achilles tendon, bilaterally, with calcaneal fat pad taping    Modalities (none performed today)  - Ultrasound, 3 mhz, 100%, 0.5 W/Cm2, 7:30 each foot    Treatment/Session Assessment:    · Response to Treatment:  Patient continues have complaints of pain with prolonged standing at work and overall progress continues to be inconsistent and slow. Patient to have MRI next week and will follow up after receiving results. · Compliance with Program/Exercises: compliant most of the time. · Recommendations/Intent for next treatment session: \"Next visit will focus on advancements to more challenging activities\". Total Treatment Duration: 60 minutes   PT Patient Time In/Time Out  Time In: 1330  Time Out: [address] [place]

## 2017-07-13 NOTE — PROGRESS NOTES
Jerod Mckeon  : [facility] [address]  [phone]   [phone]          OUTPATIENT PHYSICAL THERAPY:Discontinuation Summary 2017    ICD-10: Treatment Diagnosis: Plantar fascial fibromatosis (M72.2), Tarsal tunnel syndrome, left and right (G57.51, G57.52), Pain in right ankle/joints of foot (M25.571), Pain in left ankle/joints of foot (M25.572)  Precautions/Allergies:   No known allergies  Fall Risk Score: 0 (? 5 = High Risk)  MD Orders: Evaluate and Treat MEDICAL/REFERRING DIAGNOSIS:  Plantar fascial fibromatosis [M72.2]  Tarsal tunnel syndrome, left lower limb [G57.52]  Tarsal tunnel syndrome, right lower limb [G57.51]   DATE OF ONSET: 3 years ago  REFERRING PHYSICIAN: Federico Bower MD  RETURN PHYSICIAN APPOINTMENT: TBD     INITIAL ASSESSMENT:  Ms. Katherin Coker presents with bilateral plantar fasciitis which has been present for the past 3 years. Her chief complaint is pain but also has decreased flexibility, strength and functional activity tolerance which limits her ability to perform work and ADL tasks without complaints of severe foot pain. She will benefit from skilled therapy in order to improve her strength, flexibility and pain to return to highest level of function possible    17: [place] wishing to discontinue therapy at this time in order to have her feet further evaluated due to slow progress. She will continue with HEP at this time and follow up as needed in future. PROBLEM LIST (Impacting functional limitations):  1. Decreased Strength  2. Decreased ADL/Functional Activities  3. Decreased Balance  4. Increased Pain  5. Decreased Activity Tolerance  6. Decreased Flexibility/Joint Mobility  7. Decreased [place] with Home Exercise Program INTERVENTIONS PLANNED:  1. Balance Exercise  2. Home Exercise Program (HEP)  3. Manual Therapy  4. Range of Motion (ROM)  5.  Therapeutic Exercise/Strengthening  6. Ultrasound (US)   TREATMENT PLAN:    GOALS: (Goals have been discussed and agreed upon with patient.)  Short-Term Functional Goals: Time Frame: 4 weeks  1. Patient will be independent with home exercise program without assistance from therapist. MET  2. Patient will report pain rated at less than 6/10 at end of work day to demonstrate improved activity tolerance and decreased pain with prolonged standing. MADE PROGRESS, NOT MET  3. Patient will report ability to go up/down stairs with less pain to maximize ADL function. MADE PROGRESS, NOT MET  Discharge Goals: Time Frame: 12  1. Patient will report FAAM score of 65/84 or greater to demonstrate improved functional capacity. MADE PROGRESS, NOT MET  2. Patient will demonstrate ability to perform 25 or more SL heel raises bilaterally to demonstrate improved LE function and strength. MADE PROGRESS, NOT MET  3. Patient will report pain rated at less than 3/10 with prolonged walking or standing to maximize work performance. MADE PROGRESS, NOT MET  Rehabilitation Potential For Stated Goals: Good              The information in this section was collected on 5/3/17 (except where otherwise noted). HISTORY:   History of Present Injury/Illness (Reason for Referral):  [facility] presents with chronic history of bilateral foot pain. She originally began having pain 3 years ago and was seen by podiatrist who provided injections and inserts, neither of which provided her any relief. She then saw Dr. Ev Guo who prescribed does of Prednisone, aircasts, night splints and braces which have helped some the past few weeks. Her goals are to decrease her pain, avoid surgery and improve enough to go on trip to [facility] in September without difficulty. Past Medical History/Comorbidities:   Ms. Jeovanny Corcoran has past medical history significant for anxiety. Social History/Living Environment:     Lives in private setting home, no barriers to progress.    Prior Level of Function/Work/Activity:  Works a  at [email or web address], which requires her to be on her feet all day. Current Medications:     Tri-previfem, daily   Date Last Reviewed:  7/13/2017     Number of Personal Factors/Comorbidities that affect the Plan of Care: 1-2: MODERATE COMPLEXITY   EXAMINATION:   Observation/Gait Assessment:  Patient ambulates with mild antalgic pattern (barefoot) with increased left hip IR and circumduction with swing. She also tends to avoid heel strike and bearing weight along medial border of her feet. Palpation: Patient tender to palpation at medial tubercle of calcaneus and into arch of bilateral feet. She also notes some intermittent numbness in her 2nd and 3rd toes both feet. Strength:       RIGHT LEFT    Ankle plantarflexion 15 SL heel raises, painful 15 SL heel raises, painful     Ankle dorsiflexion 5/5 5/5    Ankle eversion 5/5 5/5    Ankle inversion 4/5 4/5          ROM:       RIGHT LEFT    Ankle plantarflexion WNL WNL    Ankle dorsiflexion Slight restriction Slight restriction    Great toe extension <45 degrees PROM <45 degrees PROM    Ankle eversion WNL WNL    Ankle inversion WNL WNL                Flexibility:       RIGHT LEFT    Gastrocnemius  Moderate restriction Moderate restriction    Soleus Moderate restriction Moderate restriction    Plantar fascia Moderate restriction Moderate restriction          Functional Mobility:            Overhead Deep Squat To 90, painful To 90, painful    Toe touch Painful Painful    Stepup Painful Painful    Step down Painful  Painful                            Body Structures Involved:  1. Bones  2. Joints  3. Muscles Body Functions Affected:  1. Sensory/Pain  2. Neuromusculoskeletal  3. Movement Related Activities and Participation Affected:  1. General Tasks and Demands  2. Mobility  3. Self Care  4.  Community, Social and [place]   Number of elements (examined above) that affect the Plan of Care: 4+: HIGH COMPLEXITY CLINICAL PRESENTATION:   Presentation: Stable and uncomplicated: LOW COMPLEXITY   CLINICAL DECISION MAKING:   Outcome Measure: Tool Used: PT/OT FOOT AND ANKLE ABILITY MEASURE  Score:  Initial: 47/84 (5/4/17) Most Recent: X (Date: -- )   Interpretation of Score: For the \"Activities of Daily Living\", there are 21 questions each scored on a 5 point scale with 0 representing \"Unable to do\" and 4 representing \"No difficulty\". The lower the score, the greater the functional disability. 84/84 represents no disability. Minimal detectable change is 5.7 points. With the addition of the 8 questions in the \"Sports Subscale,\" there are 29 questions, each scored on a 5 point scale with 0 representing \"Unable to do\" and 4 representing \"No difficulty\". The lower the score, the greater the functional disability. 116/116 represents no disability. Minimal detectable change is 12.3 points. Medical Necessity:   · Patient is expected to demonstrate progress in strength and range of motion to increase independence with ADL and work related tasks. Reason for Services/Other Comments:  · Patient continues to require present interventions due to patient's inability to walk or stand for prolonged periods of time. Use of outcome tool(s) and clinical judgement create a POC that gives a: Clear prediction of patient's progress: LOW COMPLEXITY                 Recommendations: Discharge from physical therapy. Ju Marvin.  Karen